# Patient Record
Sex: FEMALE | Race: OTHER | HISPANIC OR LATINO | URBAN - METROPOLITAN AREA
[De-identification: names, ages, dates, MRNs, and addresses within clinical notes are randomized per-mention and may not be internally consistent; named-entity substitution may affect disease eponyms.]

---

## 2023-09-13 ENCOUNTER — APPOINTMENT (RX ONLY)
Dept: URBAN - METROPOLITAN AREA CLINIC 91 | Facility: CLINIC | Age: 63
Setting detail: DERMATOLOGY
End: 2023-09-13

## 2023-09-13 DIAGNOSIS — D49.2 NEOPLASM OF UNSPECIFIED BEHAVIOR OF BONE, SOFT TISSUE, AND SKIN: ICD-10-CM

## 2023-09-13 DIAGNOSIS — L70.8 OTHER ACNE: ICD-10-CM

## 2023-09-13 PROCEDURE — 99203 OFFICE O/P NEW LOW 30 MIN: CPT

## 2023-09-13 PROCEDURE — ? COUNSELING

## 2023-09-13 PROCEDURE — ? DEFER

## 2023-09-13 PROCEDURE — ? OTHER

## 2023-09-13 ASSESSMENT — LOCATION DETAILED DESCRIPTION DERM
LOCATION DETAILED: LEFT MID-UPPER BACK
LOCATION DETAILED: INFERIOR THORACIC SPINE
LOCATION DETAILED: RIGHT MID-UPPER BACK

## 2023-09-13 ASSESSMENT — LOCATION SIMPLE DESCRIPTION DERM
LOCATION SIMPLE: UPPER BACK
LOCATION SIMPLE: LEFT UPPER BACK
LOCATION SIMPLE: RIGHT UPPER BACK

## 2023-09-13 ASSESSMENT — LOCATION ZONE DERM: LOCATION ZONE: TRUNK

## 2023-09-13 NOTE — HPI: CYST
How Severe Is Your Cyst?: mild
Is This A New Presentation, Or A Follow-Up?: Cyst
Additional History: The pt is interested in having an excision done

## 2023-09-13 NOTE — PROCEDURE: DEFER
X Size Of Lesion In Cm (Optional): 0
Introduction Text (Please End With A Colon): The following procedure was deferred:
Detail Level: Detailed
Size Of Lesion In Cm (Optional): 1.4
Procedure To Be Performed At Next Visit: Excision
Instructions (Optional): The pt will schedule an excision appointment with Dr. Kaylee Troncoso in the future

## 2023-09-18 ENCOUNTER — APPOINTMENT (RX ONLY)
Dept: URBAN - METROPOLITAN AREA CLINIC 91 | Facility: CLINIC | Age: 63
Setting detail: DERMATOLOGY
End: 2023-09-18

## 2023-09-18 DIAGNOSIS — D49.2 NEOPLASM OF UNSPECIFIED BEHAVIOR OF BONE, SOFT TISSUE, AND SKIN: ICD-10-CM

## 2023-09-18 PROCEDURE — 11402 EXC TR-EXT B9+MARG 1.1-2 CM: CPT

## 2023-09-18 PROCEDURE — ? EXCISION

## 2023-09-18 PROCEDURE — 12032 INTMD RPR S/A/T/EXT 2.6-7.5: CPT

## 2023-09-18 ASSESSMENT — LOCATION ZONE DERM: LOCATION ZONE: TRUNK

## 2023-09-18 ASSESSMENT — LOCATION SIMPLE DESCRIPTION DERM: LOCATION SIMPLE: UPPER BACK

## 2023-09-18 ASSESSMENT — LOCATION DETAILED DESCRIPTION DERM: LOCATION DETAILED: INFERIOR THORACIC SPINE

## 2023-09-18 NOTE — PROCEDURE: EXCISION

## 2023-10-02 ENCOUNTER — APPOINTMENT (RX ONLY)
Dept: URBAN - METROPOLITAN AREA CLINIC 91 | Facility: CLINIC | Age: 63
Setting detail: DERMATOLOGY
End: 2023-10-02

## 2023-10-02 DIAGNOSIS — Z48.02 ENCOUNTER FOR REMOVAL OF SUTURES: ICD-10-CM

## 2023-10-02 PROCEDURE — ? SUTURE REMOVAL

## 2023-10-02 ASSESSMENT — LOCATION DETAILED DESCRIPTION DERM: LOCATION DETAILED: INFERIOR THORACIC SPINE

## 2023-10-02 ASSESSMENT — LOCATION ZONE DERM: LOCATION ZONE: TRUNK

## 2023-10-02 ASSESSMENT — LOCATION SIMPLE DESCRIPTION DERM: LOCATION SIMPLE: UPPER BACK

## 2024-10-04 ENCOUNTER — APPOINTMENT (RX ONLY)
Dept: URBAN - METROPOLITAN AREA CLINIC 101 | Facility: CLINIC | Age: 64
Setting detail: DERMATOLOGY
End: 2024-10-04

## 2024-10-04 PROCEDURE — ? ADDITIONAL NOTES

## 2024-10-04 NOTE — PROCEDURE: ADDITIONAL NOTES
Render Risk Assessment In Note?: no
Additional Notes: Patient's concerns include: \\n- Upper and lower eyelid aging \\n\\n\\nFactors altering surgical decisions (hx/exam findings): \\n- Moderate upper lid dermatochalasis not obstructing the visual field and no ptosis \\n- Mild to moderate lower lid skin redundancy with tear trough deformity \\n- Good brow position\\n\\n\\nProposed intervention(s): \\n- Recommend upper blepharoplasty and lower blepharoplasty including arcus marginalis release with selective fat removal and possible canthopexy. Discussed expectations regarding surgical results and timeline of healing as well as resolution of edema by 3 months._____Patient will need to stop using nicotine at least 4-6 weeks prior to surgery. \\n- Discussed all procedures in detail including the planned incision patterns located in the upper lid crease and just below the lower eyelash line as well as the postop course including suture removal timing, activity restrictions x6 weeks, and postop use of eye drops/ointment. \\n- Discussed risks, benefits, and alternatives for the discussed procedures with the risks including infection, bleeding, scar, need for future surgery, damage to nearby structures, asymmetry, edema/ecchymosis, pain, hematoma,, poor cosmetic result, skin necrosis, ectropion, lid laxity/scleral show, lagophthalmos, delayed wound healing, nerve injury, return of aging changes, distortion of anatomic landmarks, injury to the globe, blindness, loss of vision, inability to close the eye completely, eyelid malposition, dry eyes, and incomplete correction.\\n- Patient was provided with pricing info for the procedures discussed \\n- ____Patient will need to stop using nicotine for 4-6 weeks before surgery confirmed by urine test  as well as during initial postop recovery. Discussed risks of nicotine use related to surgery including delayed healing, infection, and decreased perfusion.\\n\\n\\n\\n\\nI counseled the patient regarding the following:\\n- Aging Face Care: Many options exist for improving the appearance of the aging face. These include topical skin care products, prescription-grade skin care with tretinoin and/or hydroquinone for complexion improvement, fillers and neurotoxin injections to help correct wrinkles, laser procedures for resurfacing and complexion improvement, and surgical procedures. Many of these modalities may be combined for a comprehensive individualized treatment plan. Patients should limit sun exposure and wear sunblock with an SPF of at least 30, with regular reapplications, to minimize sun damage, which can exacerbate the appearance of facial aging. Diet, exercise, sleep and social activity are also important to maintaining a youthful appearance and healthy body. Smoking and nicotine use should be eliminated; this may sometimes require the assistance of the patient's primary care physician and support system, and nicotine weaning can often be facilitated by the use of medications to control the addictive urge for nicotine.\\n- Expectations: Aging is a natural process and is inevitable. However, limiting sun exposure, alcohol intake, drug use, and nicotine consumption are important factors to helping keep the patient younger-looking and vigorous. Recommended treatment regimens will have accompanying specific instructions for use and aftercare. Some signs of facial aging may be reversible while others may be improved, and others may not be correctable. There is no guarantee or warranty given or implied regarding the degree of correction of facial aging signs, or satisfaction with the final outcome. Results may take weeks to months to be fully realized, and longevity of results is variable among individual patients. Multiple procedures and products may be offered as part of a menu of options.